# Patient Record
Sex: FEMALE | Race: WHITE | NOT HISPANIC OR LATINO | Employment: FULL TIME | URBAN - METROPOLITAN AREA
[De-identification: names, ages, dates, MRNs, and addresses within clinical notes are randomized per-mention and may not be internally consistent; named-entity substitution may affect disease eponyms.]

---

## 2021-10-06 ENCOUNTER — LAB REQUISITION (OUTPATIENT)
Dept: LAB | Facility: CLINIC | Age: 41
End: 2021-10-06
Payer: COMMERCIAL

## 2021-10-06 PROCEDURE — 88305 TISSUE EXAM BY PATHOLOGIST: CPT | Mod: TC,ORL | Performed by: OBSTETRICS & GYNECOLOGY

## 2021-10-07 LAB
PATH REPORT.COMMENTS IMP SPEC: NORMAL
PATH REPORT.COMMENTS IMP SPEC: NORMAL
PATH REPORT.FINAL DX SPEC: NORMAL
PATH REPORT.GROSS SPEC: NORMAL
PATH REPORT.MICROSCOPIC SPEC OTHER STN: NORMAL
PATH REPORT.RELEVANT HX SPEC: NORMAL
PHOTO IMAGE: NORMAL

## 2021-10-07 PROCEDURE — 88305 TISSUE EXAM BY PATHOLOGIST: CPT | Mod: 26 | Performed by: PATHOLOGY

## 2022-04-19 ENCOUNTER — TRANSCRIBE ORDERS (OUTPATIENT)
Dept: MATERNAL FETAL MEDICINE | Facility: CLINIC | Age: 42
End: 2022-04-19
Payer: COMMERCIAL

## 2022-04-19 ENCOUNTER — PRE VISIT (OUTPATIENT)
Dept: MATERNAL FETAL MEDICINE | Facility: CLINIC | Age: 42
End: 2022-04-19
Payer: COMMERCIAL

## 2022-04-19 DIAGNOSIS — O26.90 PREGNANCY RELATED CONDITION: Primary | ICD-10-CM

## 2022-04-22 ENCOUNTER — HOSPITAL ENCOUNTER (OUTPATIENT)
Dept: ULTRASOUND IMAGING | Facility: CLINIC | Age: 42
Discharge: HOME | End: 2022-04-22
Attending: OBSTETRICS & GYNECOLOGY
Payer: COMMERCIAL

## 2022-04-22 ENCOUNTER — OFFICE VISIT (OUTPATIENT)
Dept: MATERNAL FETAL MEDICINE | Facility: CLINIC | Age: 42
End: 2022-04-22
Attending: OBSTETRICS & GYNECOLOGY
Payer: COMMERCIAL

## 2022-04-22 ENCOUNTER — LAB (OUTPATIENT)
Dept: LAB | Facility: CLINIC | Age: 42
End: 2022-04-22
Attending: OBSTETRICS & GYNECOLOGY
Payer: COMMERCIAL

## 2022-04-22 DIAGNOSIS — Z36.3 ENCOUNTER FOR ROUTINE SCREENING FOR FETAL MALFORMATION USING ULTRASOUND: ICD-10-CM

## 2022-04-22 DIAGNOSIS — O09.512 SUPERVISION OF ELDERLY PRIMIGRAVIDA IN SECOND TRIMESTER: ICD-10-CM

## 2022-04-22 DIAGNOSIS — O26.90 PREGNANCY RELATED CONDITION: ICD-10-CM

## 2022-04-22 DIAGNOSIS — O09.512 AMA (ADVANCED MATERNAL AGE) PRIMIGRAVIDA 35+, SECOND TRIMESTER: Primary | ICD-10-CM

## 2022-04-22 DIAGNOSIS — O28.3 ABNORMAL PRENATAL ULTRASOUND: ICD-10-CM

## 2022-04-22 DIAGNOSIS — O35.DXX0 ECHOGENIC FOCUS OF BOWEL OF FETUS AFFECTING ANTEPARTUM CARE OF MOTHER, SINGLE OR UNSPECIFIED FETUS: ICD-10-CM

## 2022-04-22 PROCEDURE — 86644 CMV ANTIBODY: CPT

## 2022-04-22 PROCEDURE — 86644 CMV ANTIBODY: CPT | Mod: 59

## 2022-04-22 PROCEDURE — 99203 OFFICE O/P NEW LOW 30 MIN: CPT | Mod: 25 | Performed by: OBSTETRICS & GYNECOLOGY

## 2022-04-22 PROCEDURE — 76811 OB US DETAILED SNGL FETUS: CPT | Mod: 26 | Performed by: OBSTETRICS & GYNECOLOGY

## 2022-04-22 PROCEDURE — 36415 COLL VENOUS BLD VENIPUNCTURE: CPT

## 2022-04-22 PROCEDURE — 999N000069 HC STATISTIC GENETIC COUNSELING, < 16 MIN: Performed by: GENETIC COUNSELOR, MS

## 2022-04-22 PROCEDURE — 86645 CMV ANTIBODY IGM: CPT

## 2022-04-22 PROCEDURE — 76811 OB US DETAILED SNGL FETUS: CPT

## 2022-04-22 NOTE — PROGRESS NOTES
[facility] [place]  Genetic Counseling Consult    Patient:  Ambreen Smith YOB: 1980   Date of Service:  22      Ambreen Smith was seen at the [facility] [place] for genetic consultation as part of her appointment for comprehensive ultrasound.  The indication for genetic counseling is advanced maternal age and abnormal fetal ultrasound. She was accompanied by her partner.        Impression/Plan:   1. Ambreen has not had serum screening in this pregnancy.     2. Ambreen had a comprehensive (level II) ultrasound today.  Please see the ultrasound report for further details.    3. The patient had a blood draw for NIPT (NIPS without sex chromosome aneuploidies test through [facility]).  Results are expected within 7-10 days, and will be available in [facility].  We will contact her to discuss the results, and a copy will be forwarded to the office of the referring OB provider.     4. The patient also had a blood draw for CMV infection studies (IgM, IgG, and avidity). Results are expected within 7-10 days, and will be available in [facility].  We will contact her to discuss the results, and a copy will be forwarded to the office of the referring OB provider.    5. Ambreen Smith provided verbal permission for results to be left on her voicemail.    Pregnancy History:   /Parity:      Age at Delivery: 41 year old  MARIALUISA: 2022, by Last Menstrual Period  Gestational Age: 23w0d    Carrier Screening:   Expanded carrier screening for mutations in a large panel of genes associated with autosomal recessive conditions including cystic fibrosis, spinal muscular atrophy, and others, is now available.    The patient has had previous carrier screening for fourteen conditions including cystic fibrosis, the results of which were negative.  A copy of the report was available for our review today.       Risk Assessment for Chromosome Conditions:   We  explained that the risk for fetal chromosome abnormalities increases with maternal age. We discussed specific features of common chromosome abnormalities, including Down syndrome, trisomy 13, trisomy 18, and sex chromosome trisomies.      At age 41 at midtrimester, the risk to have a baby with Down syndrome is 1 in 56.     At age 41 at midtrimester, the risk to have a baby with any chromosome abnormality is 1 in 31.       Ambreen did not have maternal serum screening earlier in pregnancy.     We discussed common aneuploidy markers identified on level II ultrasounds. Aneuploidy means an abnormal number of chromosomes. These markers are used to adjust age-related risk for chromosome abnormalities. While markers are often seen in babies without a chromosome condition, they are seen slightly more often in babies with a condition. Therefore, each marker is associated with a different increase in risk but alone not diagnose a condition, such a Down syndrome.     Today's level II ultrasound at 23w0d identified an echogenic bowel which can be describes as a bright spot in the fetal bowel. Echogenic areas in the bowel can be associated with infections, chromosome problems, and cystic fibrosis. Blockages or abnormalities of the intestinal tract can also be associated with echogenic bowels, however, there are typically additional ultrasound findings. If bleeding has occurred during the pregnancy this can also be a cause for an echogenic bowel.     Echogenic bowel can be seen in 1-2% fetus without Down syndrome. However, in the second trimester ultrasound about 13-21% of fetuses with Down syndrome have echogenic bowel.     We discussed the following risk adjustment:    Age-related aneuploidy risk    1.8%   Likelihood ratio    5.5-6.7x     Modified aneuploidy risk  9.9-12.1%       We discussed the options for more information including a cell-free DNA non-invasive screen (NIPT) or a diagnostic option such as an amniocentesis. We  discussed that a screen would provide a more accurate risk assessment for this pregnancy by analyzing the cell-free DNA from the placenta in maternal blood. However, only an amniocentesis can provide diagnostic information by directly analyzing the chromosomes from fetal cells in the amniotic fluid.     The presence of an echogenic bowel increases the risk for cystic fibrosis in the fetus to 1-3%. Therefore, carrier screening for each parent would be recommended. Diagnostic testing for cystic fibrosis would also be possible through amniocentesis and recommended if both parents are known carriers for the condition. Ambreen already has negative cystic fibrosis carrier screening.    Echogenic bowel can also be caused by infections, including toxoplasma gondii and cytomegalovirus. Therefore, infection studies on maternal blood or amniotic fluid is typically recommended.     Testing Options:   We discussed the following options:   Non-invasive Prenatal Testing (NIPT)    Maternal plasma cell-free DNA testing; first trimester ultrasound with nuchal translucency and nasal bone assessment is recommended, when appropriate    Screens for fetal trisomy 21, trisomy 13, trisomy 18, and sex chromosome aneuploidy    Cannot screen for open neural tube defects; maternal serum AFP after 15 weeks is recommended     Genetic Amniocentesis    Invasive procedure typically performed in the second trimester by which amniotic fluid is obtained for the purpose of chromosome analysis and/or other prenatal genetic analysis    Diagnostic results; >99% sensitivity for fetal chromosome abnormalities    AFAFP measurement tests for open neural tube defects    We reviewed the benefits and limitations of this testing.  Screening tests provide a risk assessment specific to the pregnancy for certain fetal chromosome abnormalities, but cannot definitively diagnose or exclude a fetal chromosome abnormality.  Follow-up genetic counseling and consideration of  diagnostic testing is recommended with any abnormal screening result.     Diagnostic tests carry inherent risks- including risk of miscarriage- that require careful consideration.  These tests can detect fetal chromosome abnormalities with greater than 99% certainty.  Results can be compromised by maternal cell contamination or mosaicism, and are limited by the resolution of cytogenetic G-banding technology.  There is no screening nor diagnostic test that can detect all forms of birth defects or mental disability.    It was a pleasure to be involved with Ambreen alamo care. Face-to-face time of the meeting was 15 minutes.    Suellen Mueller MS, [facility]  Licensed Genetic Counselor   [facility]  Maternal Fetal Medicine  [email or web address]  Office: [phone]  Pager [phone]  [facility]: [phone]   Fax: [phone]

## 2022-04-22 NOTE — PROGRESS NOTES
We discussed the findings on today's ultrasound with the patient. We reviewed the limitations of ultrasound to detect all fetal structural abnormalities. Ultrasound will detect approximately 80-90% of all fetal structural abnormalities. Limitations are for those not evident on scan such as spina bifida occulta or abnormalities that may develop over time such as aortic coarctation or cerebral ventriculomegaly.    The fetal bowel appeared echogenic on today's ultrasound. The presence of echogenic bowel gives an associated relative risk for Down syndrome of 6.7. This finding increases your patient's age related risk for Down syndrome in this pregnancy. Echogenic bowel also increases the risk for Cystic Fibrosis to 1%. We also discussed the availability of Cystic Fibrosis carrier screening for the patient and her partner. Patient has had carrier screening negative for CF.  Echogenic bowel can also be associated with congenital CMV infections. We discussed with the patient the availability of serologies for CMV. Alternatives available for detecting fetal anomalies, aneuploidy and predicting developmental outcome for this pregnancy were thoroughly discussed. The risks, benefits and limitations of  cell-free DNA screening, ultrasound and genetic amniocentesis were thoroughly reviewed with the patient. We discussed the availability of amniocentesis for the precise diagnosis of chromosomal abnormalities including the associated procedure-related risk of pregnancy loss of 1/500. The patient declined aneuploidy diagnostic tests but does want to proceed with NIPT and serologies for congenital CMV infection.     Patient consents to proceed with meeting with our genetic counselor and have NIPT for aneuploidy risk assessment and serology for CMV.    Return to primary provider for continued prenatal care.     Further ultrasound studies as clinically indicated based on test results.    Patient is aware and understands why she has  come for her ultrasound today and states that she feels that all of her questions have been answered to her satisfaction.    Thank you for the opportunity to participate in the care of this patient.  If you have questions regarding today's evaluation or if we can be of further service, please contact the [facility]etal [facility] Center.    **Fetal anomalies may be present but not detected*    I personally spent a total of 30 minutes, including both face-to-face and non-face-to-face on the date of the encounter, addressing the above diagnosis. Activities performed in this time include chart review, obtaining / reviewing history, performing a medically necessary evaluation, documentation and counseling/care coordination/ordering tests, equivalent to medical decision making that is of moderate risk.     David Sam M.D.  Maternal Fetal Medicine

## 2022-04-25 ENCOUNTER — TELEPHONE (OUTPATIENT)
Dept: MATERNAL FETAL MEDICINE | Facility: CLINIC | Age: 42
End: 2022-04-25
Payer: COMMERCIAL

## 2022-04-25 LAB
CMV IGG SERPL IA-ACNC: 6.1 U/ML
CMV IGG SERPL IA-ACNC: ABNORMAL
CMV IGM SERPL IA-ACNC: <8 AU/ML
CMV IGM SERPL IA-ACNC: NEGATIVE

## 2022-04-25 NOTE — TELEPHONE ENCOUNTER
"Left voicemail for Ambreen per her wishes with cytomegalovirus (CMV) infection study results. This testing was recommended after the finding of a fetal echogenic bowel.     The testing is performed by screening the pregnant person's blood for evidence of an immune response to the virus (not the virus itself). The immune system creates antibodies in response to infection. The purpose of antibodies are to \"tag\" a virus to tell the immune system to clear the infection. Some antibodies, called IgM are made as the immune system first detects the virus, and a different type of antibody, called IgG is made as the immune system continues to fight the virus. IgG remain in the bloodstream after the infection which gives the immune system \"memory\" if it detects the same virus again. In addition, as the immune system improves the response or \"fights\" the infection, the antibody is better at tagging or binding to the virus, this is called avidity. Therefore, the avidity typically increases the longer the immune system fights the infection. If the body detects the same virus again, IgM would be made initially, but quickly change to IgG with higher avidity.If a pregnant person has a primary (initial) CMV infection during the pregnancy, this is most concerning. Pregnancies with primary infection have the highest chance of transmission, or fetal infection, compared to recurrent infections. Primary infections during the second or third trimester have the highest rate of transmission (fetal infection), however, the most severe fetal outcomes occur with primary infections early in the pregnancy (even though chance of transmission is lower)    Ambreen's results were:    IgM negative and IgG positive, which means there is evidence of a past infection but not a current infection. This means the virus is most likely not current and therefore, fetal infection risk is low and unlikely to explain the ultrasound findings. Overall, this result is " "favorable (over other possible results) since the immune systems \"memory\" of the infection should reduce the chance of a problematic CMV infection in this and future pregnancies. Avidity is still pending and will likely confirm how long ago the infection occurred.     Suellen Mueller MS, [facility]  Licensed Genetic Counselor   [facility]  Maternal Fetal Medicine  [email or web address]  Office: [phone]  Pager [phone]  M: [phone]   Fax: [phone]                                    "

## 2022-04-26 ENCOUNTER — TELEPHONE (OUTPATIENT)
Dept: MATERNAL FETAL MEDICINE | Facility: CLINIC | Age: 42
End: 2022-04-26
Payer: COMMERCIAL

## 2022-04-26 NOTE — TELEPHONE ENCOUNTER
Ambreen would like to not add the sex chromosome aneuploidies on to her NIPT. She already knows male sex by ultrasound. We discussed she will be adding on XXY and XYY which would be unlikely to be related to the ultrasound findings. Ambreen understands and would like this information. The testing lab, [facility], will be updated to include this analysis.     Suellen Muelelr MS, [facility]  Licensed Genetic Counselor   [facility]  Maternal Fetal Medicine  [email or web address]  Office: [phone]  Pager [phone]  [facility]: [phone]   Fax: [phone]

## 2022-04-27 LAB
CMV IGG AVIDITY SERPL IA-RTO: 0.86 %
SCANNED LAB RESULT: NORMAL

## 2022-04-28 ENCOUNTER — TELEPHONE (OUTPATIENT)
Dept: MATERNAL FETAL MEDICINE | Facility: CLINIC | Age: 42
End: 2022-04-28
Payer: COMMERCIAL

## 2022-04-28 NOTE — TELEPHONE ENCOUNTER
Called and discussed normal NIPT results with Ambreen. Results indicate NO ANEUPLOIDY DETECTED for chromosomes 21, 18, 13, or sex chromosomes (XY). Male sex already known.     This puts her current pregnancy at low risk for Down syndrome, trisomy 18, trisomy 13 and sex chromosome abnormalities. This test is reported to have the following sensitivities: Down syndrome- 99%, trisomy 18- 98%, and trisomy 13- 98%. Although these results are reassuring, this does not replace a standard chromosome analysis from a chorionic villus sampling or amniocentesis.     We also reviewed the CMV avidity is high. 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 Since this testing was done at 23w0d, an early first trimester infection is technically still possible, but with no signs of current immune response, there is no concern for CMV infection.     Ambreen was happy to hear this good news.     Her results will be faxed to her primary OB to review.    Suellen Mueller MS, [facility]  Licensed Genetic Counselor   [facility]  Maternal Fetal Medicine  [email or web address]  [email or web address]  Office: [phone]  Pager [phone]  M: [phone]   Fax: [phone]

## 2023-08-02 ENCOUNTER — LAB REQUISITION (OUTPATIENT)
Dept: LAB | Facility: CLINIC | Age: 43
End: 2023-08-02
Payer: COMMERCIAL

## 2023-08-02 ENCOUNTER — LAB REQUISITION (OUTPATIENT)
Dept: LAB | Facility: CLINIC | Age: 43
End: 2023-08-02

## 2023-08-02 DIAGNOSIS — Z31.41 ENCOUNTER FOR FERTILITY TESTING: ICD-10-CM

## 2023-08-02 LAB
ESTRADIOL SERPL-MCNC: 28 PG/ML
FSH SERPL IRP2-ACNC: 8.2 MIU/ML
LH SERPL-ACNC: 5.7 MIU/ML
MIS SERPL-MCNC: 1.14 NG/ML (ref 0.03–5.5)

## 2023-08-02 PROCEDURE — 83520 IMMUNOASSAY QUANT NOS NONAB: CPT | Mod: ORL | Performed by: OBSTETRICS & GYNECOLOGY

## 2023-08-02 PROCEDURE — 82670 ASSAY OF TOTAL ESTRADIOL: CPT | Performed by: OBSTETRICS & GYNECOLOGY

## 2023-08-02 PROCEDURE — 83002 ASSAY OF GONADOTROPIN (LH): CPT | Performed by: OBSTETRICS & GYNECOLOGY

## 2023-08-02 PROCEDURE — 83001 ASSAY OF GONADOTROPIN (FSH): CPT | Performed by: OBSTETRICS & GYNECOLOGY

## 2025-04-30 ENCOUNTER — LAB REQUISITION (OUTPATIENT)
Dept: LAB | Facility: CLINIC | Age: 45
End: 2025-04-30
Payer: COMMERCIAL

## 2025-04-30 DIAGNOSIS — Z12.4 ENCOUNTER FOR SCREENING FOR MALIGNANT NEOPLASM OF CERVIX: ICD-10-CM

## 2025-04-30 PROCEDURE — G0145 SCR C/V CYTO,THINLAYER,RESCR: HCPCS | Mod: ORL | Performed by: OBSTETRICS & GYNECOLOGY

## 2025-05-05 LAB
BKR LAB AP GYN ADEQUACY: NORMAL
BKR LAB AP GYN INTERPRETATION: NORMAL
BKR LAB AP HPV REFLEX: NORMAL
BKR LAB AP LMP: NORMAL
BKR LAB AP PREVIOUS ABNL DX: NORMAL
BKR LAB AP PREVIOUS ABNORMAL: NORMAL
PATH REPORT.COMMENTS IMP SPEC: NORMAL
PATH REPORT.COMMENTS IMP SPEC: NORMAL
PATH REPORT.RELEVANT HX SPEC: NORMAL

## 2025-07-10 ENCOUNTER — TRANSFERRED RECORDS (OUTPATIENT)
Dept: HEALTH INFORMATION MANAGEMENT | Facility: CLINIC | Age: 45
End: 2025-07-10
Payer: COMMERCIAL

## 2025-07-18 ENCOUNTER — HOSPITAL ENCOUNTER (OUTPATIENT)
Dept: ULTRASOUND IMAGING | Facility: CLINIC | Age: 45
Discharge: HOME | End: 2025-07-18
Attending: STUDENT IN AN ORGANIZED HEALTH CARE EDUCATION/TRAINING PROGRAM | Admitting: STUDENT IN AN ORGANIZED HEALTH CARE EDUCATION/TRAINING PROGRAM
Payer: COMMERCIAL

## 2025-07-18 ENCOUNTER — HOSPITAL ENCOUNTER (OUTPATIENT)
Facility: CLINIC | Age: 45
Discharge: HOME | End: 2025-07-18
Attending: STUDENT IN AN ORGANIZED HEALTH CARE EDUCATION/TRAINING PROGRAM | Admitting: STUDENT IN AN ORGANIZED HEALTH CARE EDUCATION/TRAINING PROGRAM
Payer: COMMERCIAL

## 2025-07-18 VITALS
RESPIRATION RATE: 14 BRPM | WEIGHT: 114.8 LBS | SYSTOLIC BLOOD PRESSURE: 110 MMHG | DIASTOLIC BLOOD PRESSURE: 58 MMHG | HEIGHT: 62 IN | OXYGEN SATURATION: 100 % | TEMPERATURE: 97.2 F | BODY MASS INDEX: 21.12 KG/M2 | HEART RATE: 64 BPM

## 2025-07-18 DIAGNOSIS — O02.0 BLIGHTED OVUM: ICD-10-CM

## 2025-07-18 LAB
HGB BLD-MCNC: 12.5 G/DL (ref 11.7–15.7)
MCV RBC AUTO: 90 FL (ref 78–100)

## 2025-07-18 PROCEDURE — 710N000009 HC RECOVERY PHASE 1, LEVEL 1, PER MIN: Performed by: STUDENT IN AN ORGANIZED HEALTH CARE EDUCATION/TRAINING PROGRAM

## 2025-07-18 PROCEDURE — 250N000009 HC RX 250: Performed by: STUDENT IN AN ORGANIZED HEALTH CARE EDUCATION/TRAINING PROGRAM

## 2025-07-18 PROCEDURE — 88305 TISSUE EXAM BY PATHOLOGIST: CPT | Mod: TC | Performed by: STUDENT IN AN ORGANIZED HEALTH CARE EDUCATION/TRAINING PROGRAM

## 2025-07-18 PROCEDURE — 250N000013 HC RX MED GY IP 250 OP 250 PS 637

## 2025-07-18 PROCEDURE — 370N000017 HC ANESTHESIA TECHNICAL FEE, PER MIN: Performed by: STUDENT IN AN ORGANIZED HEALTH CARE EDUCATION/TRAINING PROGRAM

## 2025-07-18 PROCEDURE — 360N000076 HC SURGERY LEVEL 3, PER MIN: Performed by: STUDENT IN AN ORGANIZED HEALTH CARE EDUCATION/TRAINING PROGRAM

## 2025-07-18 PROCEDURE — 272N000001 HC OR GENERAL SUPPLY STERILE: Performed by: STUDENT IN AN ORGANIZED HEALTH CARE EDUCATION/TRAINING PROGRAM

## 2025-07-18 PROCEDURE — 710N000012 HC RECOVERY PHASE 2, PER MINUTE: Performed by: STUDENT IN AN ORGANIZED HEALTH CARE EDUCATION/TRAINING PROGRAM

## 2025-07-18 PROCEDURE — 999N000063 US INTRAOPERATIVE: Mod: TC

## 2025-07-18 PROCEDURE — 999N000141 HC STATISTIC PRE-PROCEDURE NURSING ASSESSMENT: Performed by: STUDENT IN AN ORGANIZED HEALTH CARE EDUCATION/TRAINING PROGRAM

## 2025-07-18 PROCEDURE — 85018 HEMOGLOBIN: CPT

## 2025-07-18 PROCEDURE — 36415 COLL VENOUS BLD VENIPUNCTURE: CPT

## 2025-07-18 PROCEDURE — 250N000011 HC RX IP 250 OP 636

## 2025-07-18 RX ORDER — ONDANSETRON 4 MG/1
4 TABLET, ORALLY DISINTEGRATING ORAL ONCE
Status: COMPLETED | OUTPATIENT
Start: 2025-07-18 | End: 2025-07-18

## 2025-07-18 RX ORDER — DEXAMETHASONE SODIUM PHOSPHATE 4 MG/ML
4 INJECTION, SOLUTION INTRA-ARTICULAR; INTRALESIONAL; INTRAMUSCULAR; INTRAVENOUS; SOFT TISSUE
Status: DISCONTINUED | OUTPATIENT
Start: 2025-07-18 | End: 2025-07-18 | Stop reason: HOSPADM

## 2025-07-18 RX ORDER — OXYCODONE HYDROCHLORIDE 5 MG/1
5 TABLET ORAL
Status: DISCONTINUED | OUTPATIENT
Start: 2025-07-18 | End: 2025-07-18 | Stop reason: HOSPADM

## 2025-07-18 RX ORDER — ACETAMINOPHEN 325 MG/1
975 TABLET ORAL ONCE
Status: DISCONTINUED | OUTPATIENT
Start: 2025-07-18 | End: 2025-07-18 | Stop reason: HOSPADM

## 2025-07-18 RX ORDER — FENTANYL CITRATE 0.05 MG/ML
50 INJECTION, SOLUTION INTRAMUSCULAR; INTRAVENOUS EVERY 5 MIN PRN
Status: DISCONTINUED | OUTPATIENT
Start: 2025-07-18 | End: 2025-07-18 | Stop reason: HOSPADM

## 2025-07-18 RX ORDER — ONDANSETRON 2 MG/ML
4 INJECTION INTRAMUSCULAR; INTRAVENOUS ONCE
Status: COMPLETED | OUTPATIENT
Start: 2025-07-18 | End: 2025-07-18

## 2025-07-18 RX ORDER — HYDROMORPHONE HCL IN WATER/PF 6 MG/30 ML
0.4 PATIENT CONTROLLED ANALGESIA SYRINGE INTRAVENOUS EVERY 5 MIN PRN
Status: DISCONTINUED | OUTPATIENT
Start: 2025-07-18 | End: 2025-07-18 | Stop reason: HOSPADM

## 2025-07-18 RX ORDER — DOXYCYCLINE 100 MG/1
200 CAPSULE ORAL ONCE
Status: COMPLETED | OUTPATIENT
Start: 2025-07-18 | End: 2025-07-18

## 2025-07-18 RX ORDER — FENTANYL CITRATE 0.05 MG/ML
25 INJECTION, SOLUTION INTRAMUSCULAR; INTRAVENOUS EVERY 5 MIN PRN
Status: DISCONTINUED | OUTPATIENT
Start: 2025-07-18 | End: 2025-07-18 | Stop reason: HOSPADM

## 2025-07-18 RX ORDER — HYDROMORPHONE HCL IN WATER/PF 6 MG/30 ML
0.2 PATIENT CONTROLLED ANALGESIA SYRINGE INTRAVENOUS EVERY 5 MIN PRN
Status: DISCONTINUED | OUTPATIENT
Start: 2025-07-18 | End: 2025-07-18 | Stop reason: HOSPADM

## 2025-07-18 RX ORDER — NALOXONE HYDROCHLORIDE 0.4 MG/ML
0.1 INJECTION, SOLUTION INTRAMUSCULAR; INTRAVENOUS; SUBCUTANEOUS
Status: DISCONTINUED | OUTPATIENT
Start: 2025-07-18 | End: 2025-07-18 | Stop reason: HOSPADM

## 2025-07-18 RX ORDER — ONDANSETRON 4 MG/1
4 TABLET, ORALLY DISINTEGRATING ORAL EVERY 30 MIN PRN
Status: DISCONTINUED | OUTPATIENT
Start: 2025-07-18 | End: 2025-07-18 | Stop reason: HOSPADM

## 2025-07-18 RX ORDER — SODIUM CHLORIDE, SODIUM LACTATE, POTASSIUM CHLORIDE, CALCIUM CHLORIDE 600; 310; 30; 20 MG/100ML; MG/100ML; MG/100ML; MG/100ML
INJECTION, SOLUTION INTRAVENOUS CONTINUOUS
Status: DISCONTINUED | OUTPATIENT
Start: 2025-07-18 | End: 2025-07-18 | Stop reason: HOSPADM

## 2025-07-18 RX ORDER — LIDOCAINE HYDROCHLORIDE AND EPINEPHRINE 10; 10 MG/ML; UG/ML
INJECTION, SOLUTION INFILTRATION; PERINEURAL
Status: DISCONTINUED
Start: 2025-07-18 | End: 2025-07-18 | Stop reason: HOSPADM

## 2025-07-18 RX ORDER — ONDANSETRON 2 MG/ML
4 INJECTION INTRAMUSCULAR; INTRAVENOUS EVERY 30 MIN PRN
Status: DISCONTINUED | OUTPATIENT
Start: 2025-07-18 | End: 2025-07-18 | Stop reason: HOSPADM

## 2025-07-18 RX ORDER — LABETALOL HYDROCHLORIDE 5 MG/ML
10 INJECTION, SOLUTION INTRAVENOUS
Status: DISCONTINUED | OUTPATIENT
Start: 2025-07-18 | End: 2025-07-18 | Stop reason: HOSPADM

## 2025-07-18 RX ORDER — ACETAMINOPHEN 325 MG/1
975 TABLET ORAL ONCE
Status: COMPLETED | OUTPATIENT
Start: 2025-07-18 | End: 2025-07-18

## 2025-07-18 RX ORDER — IBUPROFEN 400 MG/1
800 TABLET, FILM COATED ORAL ONCE
Status: DISCONTINUED | OUTPATIENT
Start: 2025-07-18 | End: 2025-07-18 | Stop reason: HOSPADM

## 2025-07-18 RX ADMIN — ACETAMINOPHEN 975 MG: 325 TABLET ORAL at 06:35

## 2025-07-18 RX ADMIN — DOXYCYCLINE HYCLATE 200 MG: 100 CAPSULE ORAL at 06:57

## 2025-07-18 RX ADMIN — ONDANSETRON 4 MG: 4 TABLET, ORALLY DISINTEGRATING ORAL at 06:35

## 2025-07-18 ASSESSMENT — ACTIVITIES OF DAILY LIVING (ADL)
ADLS_ACUITY_SCORE: 41

## 2025-07-18 NOTE — BRIEF OP NOTE
[facility]    Brief Operative Note    Pre-operative diagnosis: Blighted ovum [O02.0]  Post-operative diagnosis Same as pre-operative diagnosis    Procedure: suction dilation and curettage with ultrasound guidance, N/A - Vagina    Surgeon: Surgeons and Role:     * Staci Aleman DO - Primary  Anesthesia: MAC   Estimated Blood Loss: 50cc  IVF: See anesthesia log  UOP: pt voided on call to OR    Drains: None  Specimens:   ID Type Source Tests Collected by Time Destination   1 : Blighted ovum Products of Conception Products of Conception SURGICAL PATHOLOGY EXAM Staci Aleman DO 7/18/2025  8:14 AM      Findings:   Normal appearing external genitalia. Vaginal mucosa with rugae and no increased discharge. Cervix without masses or lesions. TAUS with empty gestational sac and ALBA noted. At conclusion of case, thin endometrial echo appreciated without vascular flow.   Complications: None.  Implants: * No implants in log *    See operative dictation for details.     Staci Aleman DO  Obstetrics and Gynecology

## 2025-07-18 NOTE — OR NURSING
No discharge medications. Patient vital signs stable. Meets criteria for discharge.  Discharge instructions reviewed with pt and pt's designated responsible party.

## 2025-07-18 NOTE — OP NOTE
[facility]     Operative Report     Pre-operative diagnosis:         Blighted ovum [O02.0]  Post-operative diagnosis        Same as pre-operative diagnosis     Procedure:      suction dilation and curettage with ultrasound guidance, N/A - Vagina     Surgeon:         Surgeons and Role:     * Staci Aleman DO - Primary  Anesthesia:     MAC      Estimated Blood Loss: 50cc  IVF: See anesthesia log  UOP: pt voided on call to OR     Drains: None  Specimens:       ID Type Source Tests Collected by Time Destination   1 : Blighted ovum Products of Conception Products of Conception SURGICAL PATHOLOGY EXAM Staci Aleman DO 2025  8:14 AM        Findings:                     Normal appearing external genitalia. Vaginal mucosa with rugae and no increased discharge. Cervix without masses or lesions. TAUS with empty gestational sac and ALBA noted. At conclusion of case, thin endometrial echo appreciated without vascular flow.   Complications:            None.  Implants:         * No implants in log *             Condition: Stable, transported to PACU.      Indications and Consent:  Ambreen Smith is a 44 year old  who presented to the office with concerns for a blighted ovum after embryo transfer with [facility]. Repeat ultrasound at [facility] demonstrated an empty gestational sac without visible embryo or yolk sac, confirming missed . Discussed treatment options including expectant, medical and surgical management. Patient elected to proceed with surgical intervention to expedite recovery and ability to conceive once again. Discussed procedure, possible risks, expected outcomes and recovery with patient and family at length in the office. Specifically, risks of bleeding, infection, injury to surrounding structures (bladder, bowel, uterine perforation, nerves, vessels), need for possible future surgery, uterine scarring and risks of anesthesia reviewed at length. All  questions were answered and informed consent was obtained for the procedure.      Operation in Detail:  The patient was taken to the operating room with IV running. She was placed in the dorsal supine position and underwent administration of monitored anesthetic. After anesthesia was found to be adequate, the patient was then placed in a dorsal lithotomy position. She was prepped and draped in the normal sterile fashion. Of note, bilateral lower extremities had pneumatic compression devices in place and functioning prior to induction of anesthesia. A [place] perioperative time out was performed confirming correct patient, planned procedure and suspected specimens. All present were in agreement and no concerns were voiced at this time.     A speculum was placed into the vagina to allow for adequate visualization of the cervix. A single tooth tenaculum was used to grasp the anterior lip of the cervix and the cervix was gently dilated using Hegars dilators.      With ultrasound guidance, a size 8 suction curette was placed into the uterus and carefully advanced to the fundus. The suction was turned on and after reaching the appropriate amount of pressure, the curette was rotated on the outward motion. Several passes were completed under ultrasound guidance. A gentle sharp curettage was then performed to ensure all POC were removed as a small area of tissue was still visible on the left side of the uterus. After small tissue was obtained from the left uterine fundus, the remainder of the endometrium was noted to have a gritty texture in all quadrants. One last pass was performed with the suction curette and bleeding at this time was minimal. Ultrasound demonstrated a thin endometrial echo without additional vascularity or concern for retained POC present. No evidence of perforation appreciated on ultrasound intraoperatively.      The specimen was sent to pathology for evaluation. The uterine lining again appeared thin  and avascular on ultrasound. The tenaculum was removed from the cervix and hemostasis of the anterior lip was noted after application of silver nitrate. No abnormal bleeding was noted at this time.      The patient tolerated the procedure well. All sponge, instrument and needle counts were correct x2. The patient was awoken from anesthesia and taken to the recovery room in stable condition. She will be discharged upon meeting postoperative criteria in the PACU and is scheduled to follow up in the office in 2 weeks for postoperative visit to discuss pathology.      Staci Aleman DO  Obstetrics and Gynecology

## 2025-07-18 NOTE — INTERVAL H&P NOTE
I have reviewed the surgical (or preoperative) H&P that is linked to this encounter, and examined the patient. There are no significant changes    Clinical Conditions Present on Arrival:  Clinically Significant Risk Factors Present on Admission   Miscarriage, advanced maternal age    Temp:  [96.8  F (36  C)] 96.8  F (36  C)  BP: (115)/(56) 115/56  SpO2:  [100 %] 100 %     Patient seen and examined prior to surgery. She is appropriate to proceed with scheduled procedure for blighted ovum/missed miscarriage. No changes to history noted.   Consent reviewed and signed.       Staci Aleman DO  Obstetrics and Gynecology

## 2025-07-18 NOTE — DISCHARGE INSTRUCTIONS
Today you received Toradol, an antiinflammatory medication similar to Ibuprofen.  You should not take other antiinflammatory medication, such as Ibuprofen, Motrin, Advil, Aleve, Naprosyn, etc until 2:20PM.      Today you were given 975 mg of Tylenol at 6:30AM. The recommended daily maximum dose is 4000 mg.      Same Day Surgery Discharge Instructions for  Sedation and General Anesthesia     It's not unusual to feel dizzy, light-headed or faint for up to 24 hours after surgery or while taking pain medication.  If you have these symptoms: sit for a few minutes before standing and have someone assist you when you get up to walk or use the bathroom.    You should rest and relax for the next 24 hours. We recommend you make arrangements to have an adult stay with you for at least 24 hours after your discharge.  Avoid hazardous and strenuous activity.    DO NOT DRIVE any vehicle or operate mechanical equipment for 24 hours following the end of your surgery.  Even though you may feel normal, your reactions may be affected by the medication you have received.    Do not drink alcoholic beverages for 24 hours following surgery.     Slowly progress to your regular diet as you feel able. It's not unusual to feel nauseated and/or vomit after receiving anesthesia.  If you develop these symptoms, drink clear liquids (apple juice, ginger ale, broth, 7-up, etc. ) until you feel better.  If your nausea and vomiting persists for 24 hours, please notify your surgeon.      All narcotic pain medications, along with inactivity and anesthesia, can cause constipation. Drinking plenty of liquids and increasing fiber intake will help.    For any questions of a medical nature, call your surgeon.    Do not make important decisions for 24 hours.    If you had general anesthesia, you may have a sore throat for a couple of days related to the breathing tube used during surgery.  You may use Cepacol lozenges to help with this discomfort.  If it  worsens or if you develop a fever, contact your surgeon.     If you feel your pain is not well managed with the pain medications prescribed by your surgeon, please contact your surgeon's office to let them know so they can address your concerns.         Below are some over-the-counter medications you are encouraged to utilize postoperatively:     Pain medications:   Ibuprofen 600mg every 6 hours  Tylenol 1000mg every 6-8 hours    Stool softeners:   Colace 100mg twice a day as needed  Miralax 1 capfull once a day as needed       **If you have questions or concerns about your procedure,   call Dr. Aleman [phone]**

## 2025-07-22 PROCEDURE — 88305 TISSUE EXAM BY PATHOLOGIST: CPT | Mod: 26 | Performed by: PATHOLOGY

## (undated) DEVICE — GLOVE PROTEXIS BLUE W/NEU-THERA 7.0  2D73EB70

## (undated) DEVICE — SOL NACL 0.9% IRRIG 1000ML BOTTLE 2F7124

## (undated) DEVICE — PACK TVT HYSTEROSCOPY SMA15HYFSE

## (undated) DEVICE — Device

## (undated) DEVICE — BAG DECANTER STERILE WHITE DYNJDEC09

## (undated) DEVICE — TUBING VACUUM COLLECTION 6FT 23116

## (undated) DEVICE — LINEN TOWEL PACK X5 5464

## (undated) DEVICE — SUCTION CANNULA UTERINE 08MM CVD 022108-10

## (undated) DEVICE — SOL WATER IRRIG 1000ML BOTTLE 2F7114

## (undated) RX ORDER — FENTANYL CITRATE 50 UG/ML
INJECTION, SOLUTION INTRAMUSCULAR; INTRAVENOUS
Status: DISPENSED
Start: 2025-07-18

## (undated) RX ORDER — DOXYCYCLINE 100 MG/1
CAPSULE ORAL
Status: DISPENSED
Start: 2025-07-18

## (undated) RX ORDER — ACETAMINOPHEN 325 MG/1
TABLET ORAL
Status: DISPENSED
Start: 2025-07-18

## (undated) RX ORDER — ONDANSETRON 4 MG/1
TABLET, ORALLY DISINTEGRATING ORAL
Status: DISPENSED
Start: 2025-07-18